# Patient Record
Sex: FEMALE | Race: WHITE
[De-identification: names, ages, dates, MRNs, and addresses within clinical notes are randomized per-mention and may not be internally consistent; named-entity substitution may affect disease eponyms.]

---

## 2019-09-20 ENCOUNTER — HOSPITAL ENCOUNTER (EMERGENCY)
Dept: HOSPITAL 88 - ER | Age: 44
Discharge: HOME | End: 2019-09-20
Payer: COMMERCIAL

## 2019-09-20 VITALS — SYSTOLIC BLOOD PRESSURE: 119 MMHG | DIASTOLIC BLOOD PRESSURE: 68 MMHG

## 2019-09-20 VITALS — HEIGHT: 66 IN | WEIGHT: 256 LBS | BODY MASS INDEX: 41.14 KG/M2

## 2019-09-20 DIAGNOSIS — R10.31: Primary | ICD-10-CM

## 2019-09-20 DIAGNOSIS — N30.91: ICD-10-CM

## 2019-09-20 DIAGNOSIS — R11.0: ICD-10-CM

## 2019-09-20 LAB
ALBUMIN SERPL-MCNC: 3.3 G/DL (ref 3.5–5)
ALBUMIN/GLOB SERPL: 1 {RATIO} (ref 0.8–2)
ALP SERPL-CCNC: 89 IU/L (ref 40–150)
ALT SERPL-CCNC: 13 IU/L (ref 0–55)
ANION GAP SERPL CALC-SCNC: 11.8 MMOL/L (ref 8–16)
BACTERIA URNS QL MICRO: (no result) /HPF
BASOPHILS # BLD AUTO: 0.1 10*3/UL (ref 0–0.1)
BASOPHILS NFR BLD AUTO: 0.8 % (ref 0–1)
BILIRUB UR QL: NEGATIVE
BUN SERPL-MCNC: 15 MG/DL (ref 7–26)
BUN/CREAT SERPL: 21 (ref 6–25)
CALCIUM SERPL-MCNC: 9.6 MG/DL (ref 8.4–10.2)
CHLORIDE SERPL-SCNC: 105 MMOL/L (ref 98–107)
CLARITY UR: (no result)
CO2 SERPL-SCNC: 25 MMOL/L (ref 22–29)
COLOR UR: YELLOW
DEPRECATED NEUTROPHILS # BLD AUTO: 3.5 10*3/UL (ref 2.1–6.9)
DEPRECATED RBC URNS MANUAL-ACNC: (no result) /HPF (ref 0–5)
EGFRCR SERPLBLD CKD-EPI 2021: > 60 ML/MIN (ref 60–?)
EOSINOPHIL # BLD AUTO: 0.5 10*3/UL (ref 0–0.4)
EOSINOPHIL NFR BLD AUTO: 6.8 % (ref 0–6)
EPI CELLS URNS QL MICRO: (no result) /LPF
ERYTHROCYTE [DISTWIDTH] IN CORD BLOOD: 14.2 % (ref 11.7–14.4)
GLOBULIN PLAS-MCNC: 3.4 G/DL (ref 2.3–3.5)
GLUCOSE SERPLBLD-MCNC: 94 MG/DL (ref 74–118)
HCT VFR BLD AUTO: 38 % (ref 34.2–44.1)
HGB BLD-MCNC: 12.5 G/DL (ref 12–16)
KETONES UR QL STRIP.AUTO: NEGATIVE
LEUKOCYTE ESTERASE UR QL STRIP.AUTO: (no result)
LYMPHOCYTES # BLD: 2.7 10*3/UL (ref 1–3.2)
LYMPHOCYTES NFR BLD AUTO: 36.6 % (ref 18–39.1)
MCH RBC QN AUTO: 29.4 PG (ref 28–32)
MCHC RBC AUTO-ENTMCNC: 32.9 G/DL (ref 31–35)
MCV RBC AUTO: 89.4 FL (ref 81–99)
MONOCYTES # BLD AUTO: 0.5 10*3/UL (ref 0.2–0.8)
MONOCYTES NFR BLD AUTO: 7.4 % (ref 4.4–11.3)
NEUTS SEG NFR BLD AUTO: 48.1 % (ref 38.7–80)
NITRITE UR QL STRIP.AUTO: NEGATIVE
PLATELET # BLD AUTO: 321 X10E3/UL (ref 140–360)
POTASSIUM SERPL-SCNC: 3.8 MMOL/L (ref 3.5–5.1)
PROT UR QL STRIP.AUTO: NEGATIVE
RBC # BLD AUTO: 4.25 X10E6/UL (ref 3.6–5.1)
SODIUM SERPL-SCNC: 138 MMOL/L (ref 136–145)
SP GR UR STRIP: 1.03 (ref 1.01–1.02)
UROBILINOGEN UR STRIP-MCNC: 0.2 MG/DL (ref 0.2–1)

## 2019-09-20 PROCEDURE — 85025 COMPLETE CBC W/AUTO DIFF WBC: CPT

## 2019-09-20 PROCEDURE — 87086 URINE CULTURE/COLONY COUNT: CPT

## 2019-09-20 PROCEDURE — 99283 EMERGENCY DEPT VISIT LOW MDM: CPT

## 2019-09-20 PROCEDURE — 36415 COLL VENOUS BLD VENIPUNCTURE: CPT

## 2019-09-20 PROCEDURE — 80053 COMPREHEN METABOLIC PANEL: CPT

## 2019-09-20 PROCEDURE — 81001 URINALYSIS AUTO W/SCOPE: CPT

## 2019-09-20 PROCEDURE — 74176 CT ABD & PELVIS W/O CONTRAST: CPT

## 2019-09-20 NOTE — XMS REPORT
Patient Summary Document

                             Created on: 2019



JAQUELIN JOHNSON

External Reference #: [id number]

: 1975

Sex: Female



Demographics







                          Address                   [address] [place]  [address]

 

                          Home Phone                [phone]

 

                          Preferred Language        Unknown

 

                          Marital Status            Unknown

 

                          [employer] Affiliation     Unknown

 

                          Race                      Unknown

 

                          Ethnic Group              Unknown





Author







                          Author                    [facility]

 

                          [facility]

 

                          Address                   Unknown

 

                          Phone                     Unavailable







Support







                Name            Relationship    Address         Phone

 

                    TRISTAN GARG    PRS                 UNKNOWN

[place]  [address]                     [phone]

 

                    TERESSA DOVE     PRS                 [address] [place]  [address]                     [phone]







Care Team Providers







                    Care Team Member Name    Role                Phone

 

                    SHANNAN MEJÍA    Unavailable         Unavailable







Payers







             Payer Name    Policy Type    Policy Number    Effective Date    Expiration Date







Problems

This patient has no known problems.



Allergies, Adverse Reactions, Alerts







          Allergy Name    Allergy Type    Status    Severity    Reaction(s)    Onset Date    Inactive 

Date                      Treating Clinician        Comments

 

        No Known Allergies    DA      Active    U               2019 00:00:00                     

 

        No Known Allergies    DA      Active    U               2019 00:00:00                     

 

        No Known Allergies    DA      Active    U               2016-10-02 00:00:00                     







Medications

This patient has no known medications.



Results







           Test Description    Test Time    Test Comments    Text Results    Atomic Results    Result

 Comments

 

                CT ABDOMEN/PELVIS WO    2019 05:56:00                                                      

                                                    [place] [address]      Patient Name: JAQUELIN JOHNSON                                  
MR #: [id number]                     : 1975                            
      Age/Sex: 43/F  Acct #: [id number]                              Req #: 
[id number]  Adm Physician:                                                      
Ordered by: FIORELLA MEJÍA MD                            Report #: [id number]
[id number]        Location: ER                                      Room/Bed:         
           
___________________________________________________________________________________________________
   Procedure:  CT/CT ABDOMEN/PELVIS WO  Exam Date: 19            
               Exam Time: 0237                                              
REPORT STATUS: Signed    EXAMINATION: CT of the abdomen and pelvis without 
contrast.       TECHNIQUE: Spiral CT images of the abdomen and pelvis were 
performed from the   lung bases to the lesser trochanters.  No intravenous 
contrast was given per   renal stone protocol.  Coronal and sagittal reformatted
images were obtained.      COMPARISON: CT abdomen and pelvis without contrast 
2009      CLINICAL HISTORY:Right flank pain           DISCUSSION: ABSENCE OF
INTRAVENOUS CONTRAST DECREASES SENSITIVITY FOR DETECTION   OF FOCAL LESIONS AND 
VASCULAR PATHOLOGY.      ABDOMEN/PELVIS:      LOWER THORAX: Linear subsegmental 
atelectasis versus scarring in the lingula.       HEPATOBILIARY: No focal 
hepatic lesions.  No intra or extrahepatic biliary   ductal dilation.      
GALLBLADDER: No radio-opaque stones or sludge.  No wall thickening.      SPLEEN:
No splenomegaly.      PANCREAS: No focal masses or ductal dilatation.      
ADRENALS: No adrenal nodules.      KIDNEYS/URETERS: No renal or ureteral 
calculi, hydronephrosis or obstruction.   No perinephric stranding or contour ab
normalities.      PELVIC ORGANS/BLADDER: Bladder is decompressed but grossly 
unremarkable. No   adnexal masses.      PERITONEUM/RETROPERITONEUM: No free air 
or fluid.      LYMPH NODES: No intra-abdominal,retroperitoneal, pelvic or 
inguinal   lymphadenopathy.      VESSELS: Mild atherosclerotic calcification of 
the distal abdominal aorta.      GI TRACT: No bowel dilation or evidence of 
obstruction. Appendix is well   identified and normal in caliber. No pericolonic
inflammatory changes.      BONES AND SOFT TISSUES: No aggressive lytic lesions. 
Small fat-containing   umbilical hernia.      IMPRESSION:          1. No renal, 
ureteral or bladder calculi, hydronephrosis or obstruction      Signed by: Dr. Aydin Frausto M.D. on 2019 5:58 AM        Dictated By: AYDIN FRAUSTO MD  Electronically Signed By: AYDIN FRAUSTO MD on 1958  Transcribed 
By: JORGE on 1958       COPY TO:   FIORELLA MEJÍA MD              



 

                URINALYSIS COMPLETE    2019 17:38:00                      

 

   

 

                UA COLOR (test code=COLU)    YELLOW          YELLOW           

 

                UA APPEARANCE (test code=APPU)    CLEAR           CLEAR            

 

                UA GLUCOSE DIPSTICK (test code=DGLUU)    norm mg/dL      NEGATIVE         

 

                UA BILIRUBIN DIPSTICK (test code=BILU)    NEGATIVE mg/dL    NEGATIVE         

 

                UA KETONE DIPSTICK (test code=KETU)    neg mg/dL       NEGATIVE         

 

                UA SPECIFIC GRAVITY (test code=SGU)    1.020           1.001-1.035      

 

                UA BLOOD DIPSTICK (test code=RENATE)    neg Tiburcio/uL      NEGATIVE         

 

                UA PH DIPSTICK (test code=CARLA)    6.0             5.0-8.0          

 

                UA PROTEIN DIPSTICK (test code=PROU)    neg mg/dL       Neg-15           

 

                UA UROBILINIOGEN DIPSTICK (test code=URO)    1 mg/dL         0.0-0.2          

 

                UA NITRITE DIPSTICK (test code=AMADOR)    NEGATIVE        NEGATIVE         

 

                    UA LEUKOCYTE ESTERASE DIPSTICK (test code=LEUU)    25 Carey/uL (Trace) uL    NEGATIVE 

                                         

 

                UA WBC (test code=WBCU)    0-5 per HPF     0-5              

 

                UA RBC (test code=RBCU)    0-3 per HPF     0-5              

 

                UA EPITHELIAL CELLS (test code=EPIU)    Moderate (5-10/hpf) per HPF    Few              

 

                UA BACTERIA (test code=BACU)    MODERATE per HPF    NONE             

 

                UA MUCUS (test code=MUCU)    FEW per LPF     NONE-FEW         





Urine Source? Clean CatchUR HCG [id number] 17:38:00* 





                Test Item       Value           Reference Range    Comments

 

                UR HCG QUAL (test code=HCGQLU)    NEGATIVE                        This HCGQL test is NOT applicable for

 MALE patients.Check with nurse about probable order error.If Tumor Marker Test 
needed, nurse should order test "HCGTU"(Test 
#[id number])-------------------------------------------------------





Urine Source? Clean CatchURINALYSIS [id number] 17:31:00* 





                Test Item       Value           Reference Range    Comments

 

                UA COLOR (test code=COLU)    YELLOW          YELLOW           

 

                UA APPEARANCE (test code=APPU)    CLEAR           CLEAR            

 

                UA GLUCOSE DIPSTICK (test code=DGLUU)    norm mg/dL      NEGATIVE         

 

                UA BILIRUBIN DIPSTICK (test code=BILU)    NEGATIVE mg/dL    NEGATIVE         

 

                UA KETONE DIPSTICK (test code=KETU)    neg mg/dL       NEGATIVE         

 

                UA SPECIFIC GRAVITY (test code=SGU)    1.020           1.001-1.035      

 

                UA BLOOD DIPSTICK (test code=RENATE)    neg Tiburcio/uL      NEGATIVE         

 

                UA PH DIPSTICK (test code=CARLA)    6.0             5.0-8.0          

 

                UA PROTEIN DIPSTICK (test code=PROU)    neg mg/dL       Neg-15           

 

                UA UROBILINIOGEN DIPSTICK (test code=URO)    1 mg/dL         0.0-0.2          

 

                UA NITRITE DIPSTICK (test code=AMADOR)    NEGATIVE        NEGATIVE         

 

                    UA LEUKOCYTE ESTERASE DIPSTICK (test code=LEUU)    25 Carey/uL (Trace) uL    NEGATIVE 

                                         

 

                UA WBC (test code=WBCU)     per HPF        0-5              

 

                UA RBC (test code=RBCU)     per HPF        0-5              

 

                UA EPITHELIAL CELLS (test code=EPIU)     per HPF        Few              

 

                UA BACTERIA (test code=BACU)     per HPF        NONE             





Urine Source? Clean CatchUR HCG [id number] 17:31:00* 





                Test Item       Value           Reference Range    Comments

 

                UR HCG QUAL (test code=HCGQLU)                                     





Urine Source? Clean CatchBASIC METABOLIC [id number] 13:29:00* 





                Test Item       Value           Reference Range    Comments

 

                SODIUM (test code=NA)    139 mmol/L      136-145          

 

                POTASSIUM (test code=K)    3.7 mmol/L      3.5-5.1          

 

                CHLORIDE (test code=CL)    107.0 mmol/L               

 

                CARBON DIOXIDE (test code=CO2)    26.0 mmol/L     21-32            

 

                ANION GAP (test code=GAP)    9.7             10-20            

 

                GLUCOSE (test code=GLU)    90 mg/dL                   

 

                BLOOD UREA NITROGEN (test code=BUN)    10 mg/dL        7-18             

 

                GLOMERULAR FILTRATION RATE (test code=GFR)    > 60 mL/min     >=60            Estimated GFR by 

using Modified MDRD formula.Chronic kidney disease is defined as either kidney 
damageor GFR <60 mL/min/1.73 m2 for >3 months.

 

                CREATININE (test code=CREAT)    0.70 mg/dL      0.55-1.02       **Note change in reference 

range due to change in reagent.**

 

                BUN/CREATININE RATIO (test code=BUN/CREA)    14.3            10-20            

 

                CALCIUM (test code=CA)    8.5 mg/dL       8.5-10.1         





HEPATIC FUNCTION [id number] 13:29:00* 





                Test Item       Value           Reference Range    Comments

 

                TOTAL PROTEIN (test code=PROT)    7.5 gram/dL     6.4-8.2          

 

                ALBUMIN (test code=ALB)    3.4 g/dL        3.4-5.0          

 

                GLOBULIN (test code=GLOB)    4.1 gram/dL     2.7-4.2          

 

                ALBUMIN/GLOBULIN RATIO (test code=A/G)    0.8             0.75-1.50        

 

                BILIRUBIN TOTAL (test code=BILT)    0.30 mg/dL      0.0-1.0          

 

                BILIRUBIN DIRECT (test code=BILD)    0.13 mg/dL      0.0-0.20         

 

                SGOT/AST (test code=AST)    14 IUnit/L      15-37            

 

                SGPT/ALT (test code=ALT)    21 IUnit/L      12-78            

 

                ALKALINE PHOSPHATASE TOTAL (test code=ALKP)    112 IUnit/L               **Note change 

in reference range due to change in reagent.**





[id number] 13:29:00* 





                Test Item       Value           Reference Range    Comments

 

                LIPASE (test code=LIP)    60 U/L          73.0-393.0       





HCG SERUM [id number] 13:29:00* 





                Test Item       Value           Reference Range    Comments

 

                HCG SERUM QUAL (test code=HCGQL)    NEGATIVE        NEGATIVE        This HCGQL test is NOT applicable

 for MALE patients.Check with nurse about probable order error.If Tumor Marker 
Test needed, nurse should order test "HCGTU"(Test 
#[id number])-------------------------------------------------------





BASIC METABOLIC [id number] 13:21:00* 





                Test Item       Value           Reference Range    Comments

 

                SODIUM (test code=NA)     mmol/L         136-145          

 

                POTASSIUM (test code=K)     mmol/L         3.5-5.1          

 

                CHLORIDE (test code=CL)     mmol/L                    

 

                CARBON DIOXIDE (test code=CO2)     mmol/L         21-32            

 

                ANION GAP (test code=GAP)                    10-20            

 

                GLUCOSE (test code=GLU)     mg/dL                     

 

                BLOOD UREA NITROGEN (test code=BUN)     mg/dL          7-18             

 

                GLOMERULAR FILTRATION RATE (test code=GFR)     mL/min         >=60             

 

                CREATININE (test code=CREAT)     mg/dL          0.55-1.02        

 

                BUN/CREATININE RATIO (test code=BUN/CREA)                    10-20            

 

                CALCIUM (test code=CA)     mg/dL          8.5-10.1         





HEPATIC FUNCTION [id number] 13:21:00* 





                Test Item       Value           Reference Range    Comments

 

                TOTAL PROTEIN (test code=PROT)     gram/dL        6.4-8.2          

 

                ALBUMIN (test code=ALB)     g/dL           3.4-5.0          

 

                GLOBULIN (test code=GLOB)     gram/dL        2.7-4.2          

 

                ALBUMIN/GLOBULIN RATIO (test code=A/G)                    0.75-1.50        

 

                BILIRUBIN TOTAL (test code=BILT)     mg/dL          0.0-1.0          

 

                BILIRUBIN DIRECT (test code=BILD)     mg/dL          0.0-0.20         

 

                SGOT/AST (test code=AST)     IUnit/L        15-37            

 

                SGPT/ALT (test code=ALT)     IUnit/L        12-78            

 

                ALKALINE PHOSPHATASE TOTAL (test code=ALKP)     IUnit/L                   





[id number] 13:21:00* 





                Test Item       Value           Reference Range    Comments

 

                LIPASE (test code=LIP)     U/L            73.0-393.0       





HCG SERUM [id number] 13:21:00* 





                Test Item       Value           Reference Range    Comments

 

                HCG SERUM QUAL (test code=HCGQL)    NEGATIVE        NEGATIVE        This HCGQL test is NOT applicable

 for MALE patients.Check with nurse about probable order error.If Tumor Marker 
Test needed, nurse should order test "HCGTU"(Test 
#[id number])-------------------------------------------------------





CBC W/O [id number] 13:00:00* 





                Test Item       Value           Reference Range    Comments

 

                WHITE BLOOD CELL (test code=WBC)    6.9 K/mm3       4.5-12.5         

 

                RED BLOOD CELL (test code=RBC)    4.58 mill/mm3    3.7-5.2          

 

                HEMOGLOBIN (test code=HGB)    13.0 gram/dL    11.5-15.5        

 

                HEMATOCRIT (test code=HCT)    41.3 %          36.0-46.0        

 

                MEAN CELL VOLUME (test code=MCV)    90.2 fL         80-98            

 

                MEAN CELL HGB (test code=MCH)    28.4 picogram    27.0-33.0        

 

                MEAN CELL HGB CONCETRATION (test code=MCHC)    31.5 gram/dL    33.0-36.0        

 

                RED CELL DISTRIBUTION WIDTH (test code=RDW)    14.4 %          11.6-16.2        

 

                PLATELET COUNT (test code=PLT)    273 K/mm3       150-450          

 

                MEAN PLATELET VOLUME (test code=MPV)    9.4 fL          6.7-11.0         





CBC W/O [id number] 12:57:00* 





                Test Item       Value           Reference Range    Comments

 

                WHITE BLOOD CELL (test code=WBC)     K/mm3          4.5-12.5         

 

                RED BLOOD CELL (test code=RBC)     mill/mm3       3.7-5.2          

 

                HEMOGLOBIN (test code=HGB)    13.0 gram/dL    11.5-15.5        

 

                HEMATOCRIT (test code=HCT)    41.3 %          36.0-46.0        

 

                MEAN CELL VOLUME (test code=MCV)     fL             80-98            

 

                MEAN CELL HGB (test code=MCH)     picogram       27.0-33.0        

 

                MEAN CELL HGB CONCETRATION (test code=MCHC)     gram/dL        33.0-36.0        

 

                RED CELL DISTRIBUTION WIDTH (test code=RDW)     %              11.6-16.2        

 

                PLATELET COUNT (test code=PLT)     K/mm3          150-450          

 

                MEAN PLATELET VOLUME (test code=MPV)     fL             6.7-11.0

## 2019-09-20 NOTE — DIAGNOSTIC IMAGING REPORT
EXAMINATION: CT of the abdomen and pelvis without contrast.

 

TECHNIQUE: Spiral CT images of the abdomen and pelvis were performed from the

lung bases to the lesser trochanters.  No intravenous contrast was given per

renal stone protocol.  Coronal and sagittal reformatted images were obtained.



COMPARISON: CT abdomen and pelvis without contrast 5/1/2009



CLINICAL HISTORY:Right flank pain

     

DISCUSSION: ABSENCE OF INTRAVENOUS CONTRAST DECREASES SENSITIVITY FOR DETECTION

OF FOCAL LESIONS AND VASCULAR PATHOLOGY.



ABDOMEN/PELVIS:



LOWER THORAX: Linear subsegmental atelectasis versus scarring in the lingula. 



HEPATOBILIARY: No focal hepatic lesions.  No intra or extrahepatic biliary

ductal dilation.



GALLBLADDER: No radio-opaque stones or sludge.  No wall thickening.



SPLEEN: No splenomegaly.



PANCREAS: No focal masses or ductal dilatation.



ADRENALS: No adrenal nodules.



KIDNEYS/URETERS: No renal or ureteral calculi, hydronephrosis or obstruction.

No perinephric stranding or contour abnormalities.



PELVIC ORGANS/BLADDER: Bladder is decompressed but grossly unremarkable. No

adnexal masses.



PERITONEUM/RETROPERITONEUM: No free air or fluid.



LYMPH NODES: No intra-abdominal,retroperitoneal, pelvic or inguinal

lymphadenopathy.



VESSELS: Mild atherosclerotic calcification of the distal abdominal aorta.



GI TRACT: No bowel dilation or evidence of obstruction. Appendix is well

identified and normal in caliber. No pericolonic inflammatory changes.



BONES AND SOFT TISSUES: No aggressive lytic lesions. Small fat-containing

umbilical hernia.



IMPRESSION: 





1. No renal, ureteral or bladder calculi, hydronephrosis or obstruction



Signed by: Dr. Bryn Frausto M.D. on 9/20/2019 5:58 AM

## 2019-09-22 ENCOUNTER — HOSPITAL ENCOUNTER (EMERGENCY)
Dept: HOSPITAL 88 - ER | Age: 44
LOS: 1 days | Discharge: HOME | End: 2019-09-23
Payer: COMMERCIAL

## 2019-09-22 VITALS — WEIGHT: 256 LBS | BODY MASS INDEX: 41.14 KG/M2 | HEIGHT: 66 IN

## 2019-09-22 DIAGNOSIS — F32.9: ICD-10-CM

## 2019-09-22 DIAGNOSIS — E03.9: ICD-10-CM

## 2019-09-22 DIAGNOSIS — F41.9: ICD-10-CM

## 2019-09-22 DIAGNOSIS — M54.41: Primary | ICD-10-CM

## 2019-09-22 DIAGNOSIS — K21.9: ICD-10-CM

## 2019-09-22 LAB
AMPHETAMINES UR QL SCN>1000 NG/ML: (no result)
BACTERIA URNS QL MICRO: (no result) /HPF
BENZODIAZ UR QL SCN: (no result)
BILIRUB UR QL: NEGATIVE
CLARITY UR: CLEAR
COLOR UR: YELLOW
DEPRECATED RBC URNS MANUAL-ACNC: (no result) /HPF (ref 0–5)
EPI CELLS URNS QL MICRO: (no result) /LPF
HCG UR QL: NEGATIVE
KETONES UR QL STRIP.AUTO: NEGATIVE
LEUKOCYTE ESTERASE UR QL STRIP.AUTO: NEGATIVE
NITRITE UR QL STRIP.AUTO: NEGATIVE
PCP UR QL SCN: (no result)
PROT UR QL STRIP.AUTO: (no result)
SP GR UR STRIP: 1.02 (ref 1.01–1.02)
UROBILINOGEN UR STRIP-MCNC: 0.2 MG/DL (ref 0.2–1)
WBC #/AREA URNS HPF: (no result) /HPF (ref 0–5)

## 2019-09-22 PROCEDURE — 96372 THER/PROPH/DIAG INJ SC/IM: CPT

## 2019-09-22 PROCEDURE — 81025 URINE PREGNANCY TEST: CPT

## 2019-09-22 PROCEDURE — 96374 THER/PROPH/DIAG INJ IV PUSH: CPT

## 2019-09-22 PROCEDURE — 80307 DRUG TEST PRSMV CHEM ANLYZR: CPT

## 2019-09-22 PROCEDURE — 81001 URINALYSIS AUTO W/SCOPE: CPT

## 2019-09-22 PROCEDURE — 99283 EMERGENCY DEPT VISIT LOW MDM: CPT

## 2019-09-23 VITALS — SYSTOLIC BLOOD PRESSURE: 117 MMHG | DIASTOLIC BLOOD PRESSURE: 69 MMHG

## 2023-03-20 ENCOUNTER — HOSPITAL ENCOUNTER (EMERGENCY)
Dept: HOSPITAL 88 - FSED | Age: 48
Discharge: HOME | End: 2023-03-20
Payer: COMMERCIAL

## 2023-03-20 VITALS — BODY MASS INDEX: 33.18 KG/M2 | WEIGHT: 206.44 LBS | HEIGHT: 66 IN

## 2023-03-20 DIAGNOSIS — E03.9: ICD-10-CM

## 2023-03-20 DIAGNOSIS — R10.31: Primary | ICD-10-CM

## 2023-03-20 DIAGNOSIS — N39.0: ICD-10-CM

## 2023-03-20 DIAGNOSIS — K21.9: ICD-10-CM

## 2023-03-20 DIAGNOSIS — Z98.84: ICD-10-CM

## 2023-03-20 DIAGNOSIS — F41.9: ICD-10-CM

## 2023-03-20 PROCEDURE — 80076 HEPATIC FUNCTION PANEL: CPT

## 2023-03-20 PROCEDURE — 80048 BASIC METABOLIC PNL TOTAL CA: CPT

## 2023-03-20 PROCEDURE — 81003 URINALYSIS AUTO W/O SCOPE: CPT

## 2023-03-20 PROCEDURE — 96374 THER/PROPH/DIAG INJ IV PUSH: CPT

## 2023-03-20 PROCEDURE — 99284 EMERGENCY DEPT VISIT MOD MDM: CPT

## 2023-03-20 PROCEDURE — 85025 COMPLETE CBC W/AUTO DIFF WBC: CPT

## 2023-03-20 PROCEDURE — 74176 CT ABD & PELVIS W/O CONTRAST: CPT
